# Patient Record
Sex: MALE | Race: WHITE | NOT HISPANIC OR LATINO | Employment: PART TIME | URBAN - METROPOLITAN AREA
[De-identification: names, ages, dates, MRNs, and addresses within clinical notes are randomized per-mention and may not be internally consistent; named-entity substitution may affect disease eponyms.]

---

## 2019-02-05 ENCOUNTER — HOSPITAL ENCOUNTER (OUTPATIENT)
Dept: CARDIOLOGY | Facility: MEDICAL CENTER | Age: 54
End: 2019-02-05
Attending: INTERNAL MEDICINE
Payer: COMMERCIAL

## 2019-02-05 ENCOUNTER — HOSPITAL ENCOUNTER (OUTPATIENT)
Dept: RADIOLOGY | Facility: MEDICAL CENTER | Age: 54
End: 2019-02-05
Attending: INTERNAL MEDICINE
Payer: COMMERCIAL

## 2019-02-05 ENCOUNTER — OFFICE VISIT (OUTPATIENT)
Dept: CARDIOLOGY | Facility: MEDICAL CENTER | Age: 54
End: 2019-02-05
Payer: COMMERCIAL

## 2019-02-05 ENCOUNTER — HOSPITAL ENCOUNTER (OUTPATIENT)
Dept: LAB | Facility: MEDICAL CENTER | Age: 54
End: 2019-02-05
Attending: INTERNAL MEDICINE
Payer: COMMERCIAL

## 2019-02-05 VITALS
OXYGEN SATURATION: 99 % | WEIGHT: 166 LBS | SYSTOLIC BLOOD PRESSURE: 118 MMHG | HEART RATE: 64 BPM | DIASTOLIC BLOOD PRESSURE: 80 MMHG | HEIGHT: 73 IN | BODY MASS INDEX: 22 KG/M2

## 2019-02-05 DIAGNOSIS — L65.9 HAIR LOSS: ICD-10-CM

## 2019-02-05 DIAGNOSIS — R10.12 LEFT UPPER QUADRANT PAIN: ICD-10-CM

## 2019-02-05 DIAGNOSIS — Z13.6 SCREENING FOR CARDIOVASCULAR CONDITION: ICD-10-CM

## 2019-02-05 DIAGNOSIS — G47.33 OSA (OBSTRUCTIVE SLEEP APNEA): ICD-10-CM

## 2019-02-05 DIAGNOSIS — R07.89 OTHER CHEST PAIN: ICD-10-CM

## 2019-02-05 DIAGNOSIS — I49.3 SYMPTOMATIC PVCS: ICD-10-CM

## 2019-02-05 DIAGNOSIS — R42 DIZZINESS: ICD-10-CM

## 2019-02-05 LAB
CHOLEST SERPL-MCNC: 165 MG/DL (ref 100–199)
HDLC SERPL-MCNC: 64 MG/DL
LDLC SERPL CALC-MCNC: 85 MG/DL
LV EJECT FRACT  99904: 60
LV EJECT FRACT MOD 2C 99903: 69.3
LV EJECT FRACT MOD 4C 99902: 49.75
LV EJECT FRACT MOD BP 99901: 61.74
TRIGL SERPL-MCNC: 80 MG/DL (ref 0–149)
TSH SERPL DL<=0.005 MIU/L-ACNC: 1.04 UIU/ML (ref 0.38–5.33)

## 2019-02-05 PROCEDURE — 76700 US EXAM ABDOM COMPLETE: CPT

## 2019-02-05 PROCEDURE — 80061 LIPID PANEL: CPT

## 2019-02-05 PROCEDURE — 84443 ASSAY THYROID STIM HORMONE: CPT

## 2019-02-05 PROCEDURE — 93306 TTE W/DOPPLER COMPLETE: CPT

## 2019-02-05 PROCEDURE — 99204 OFFICE O/P NEW MOD 45 MIN: CPT | Performed by: INTERNAL MEDICINE

## 2019-02-05 PROCEDURE — 93306 TTE W/DOPPLER COMPLETE: CPT | Mod: 26 | Performed by: INTERNAL MEDICINE

## 2019-02-05 PROCEDURE — 36415 COLL VENOUS BLD VENIPUNCTURE: CPT

## 2019-02-05 RX ORDER — NITROGLYCERIN 0.4 MG/1
0.4 TABLET SUBLINGUAL PRN
Qty: 25 TAB | Refills: 1 | Status: SHIPPED | OUTPATIENT
Start: 2019-02-05 | End: 2019-11-12

## 2019-02-05 ASSESSMENT — ENCOUNTER SYMPTOMS
DIZZINESS: 1
PARESTHESIAS: 1
BACK PAIN: 1
PALPITATIONS: 1
ABDOMINAL PAIN: 1

## 2019-02-05 NOTE — PROGRESS NOTES
Cardiology Initial Consultation Note    Date of note:    2/5/2019    Primary Care Provider: Shagufta Almzaan M.D.  Referring Provider: Seth Beach M.D.     Patient Name: Donovan Guzmán     YOB: 1965  MRN:              [id number]    Chief Complaint: palpitations, chest pain.     History of Present Illness: Donovan Guzmán is a 53 y.o. male whose current medical problems include previous smokint who is here for cardiac consultation for palpitations and chest pain.    He presents today with his wife Sloane.       A couple months ago he started having moderate severity palpitations which worsen with exercise and resolve with rest.  A couple days ago when hiking he starting getting dizzy and then starting having a dull moderate severity substernal chest pain.  This worsened to a sharp left sided chest pain for which he went to the [facility] emergency room. CTA was negative as was troponin. His chest pain doesn't change with position, food, or deep breathing. He continues to have this mild dull chest pain right now.  It is not exertional.     He has also felt a pulsatile mass in his left abdomen and had some back pain.     He previous saw Dr. Ervin in [place] and had a stress test, echo 2 years ago.     Two cups of coffee in the morning.  Vaping on and off all day.      Review of Systems   Cardiovascular: Positive for chest pain and palpitations.   Musculoskeletal: Positive for back pain.   Gastrointestinal: Positive for abdominal pain.   Neurological: Positive for dizziness and paresthesias.     + hair loss    All other systems reviewed and discussed using a comprehensive questionnaire and are negative.       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     No current facility-administered medications for this visit.          No Known Allergies      Family History   Problem Relation Age of Onset   • Heart Disease Sister         possible sick sinus syndrome.   • Heart Attack Neg Hx    • Heart Failure Neg Hx          Social History     Social History   • Marital status:      Spouse name: N/A   • Number of children: N/A   • Years of education: N/A     Occupational History   • Not on file.     Social History Main Topics   • Smoking status: Former Smoker     Packs/day: 1.00     Years: 30.00     Types: Cigarettes     Quit date: 2015   • Smokeless tobacco: Never Used      Comment: vaping nicotine   • Alcohol use No      Comment: previous alcoholic, quit 2014   • Drug use: No      Comment: h/o meth use   • Sexual activity: Not on file     Other Topics Concern   • Not on file     Social History Narrative    [place]         Physical Exam:  Ambulatory Vitals  Blood pressure 118/80, pulse 64, height 1.854 m (6' 1\"), weight 75.3 kg (166 lb), SpO2 99 %.   Oxygen Therapy:  Pulse Oximetry: 99 %  BP Readings from Last 4 Encounters:   02/05/19 118/80   02/03/19 141/98   02/16/17 130/95   05/06/16 130/87       Weight/BMI: Body mass index is 21.9 kg/m².  Wt Readings from Last 4 Encounters:   02/05/19 75.3 kg (166 lb)   02/03/19 77 kg (169 lb 12.1 oz)   02/16/17 78.9 kg (174 lb)   05/06/16 77.1 kg (170 lb)       General: Well appearing and in no apparent distress  Eyes: nl conjunctiva  ENT: OP clear, normal external appearance of nose and ears  Neck: JVP 4 cm H2O, no carotid bruits  Lungs: normal respiratory effort, CTAB  Heart: RRR, distant heart sounds, no murmurs, no rubs or gallops, no edema bilateral lower extremities. No LV/RV heave on cardiac palpatation. 1+ left radial pulse, 2+ right radial pulse.  2+ bilateral dp pulses.   Abdomen: soft, non tender, non distended, no masses, normal bowel sounds.  No HSM. Pulsatile aorta " palpated  Extremities/MSK: no clubbing, no cyanosis  Neurological: No focal sensory deficits  Psychiatric: Appropriate affect, A/O x 3, intact judgement and insight  Skin: Warm extremities      Lab Data Review:  No results found for: CHOLSTRLTOT, LDL, HDL, TRIGLYCERIDE    Lab Results   Component Value Date/Time    SODIUM 142 2019 07:45 PM    POTASSIUM 4.0 2019 07:45 PM    CHLORIDE 107 2019 07:45 PM    CO2 28 2019 07:45 PM    GLUCOSE 95 2019 07:45 PM    BUN 32 (H) 2019 07:45 PM    CREATININE 1.3 2019 07:45 PM     Lab Results   Component Value Date/Time    ALKPHOSPHAT 58 2019 07:45 PM    ASTSGOT 26 2019 07:45 PM    ALTSGPT 23 2019 07:45 PM    TBILIRUBIN 0.5 2019 07:45 PM      Lab Results   Component Value Date/Time    WBC 5.3 2019 07:45 PM     No components found for: HBGA1C  No components found for: TROPONIN  No components found for: BNP      Cardiac Imaging and Procedures Review:    EKG dated 2/3/2019 : My personal interpretation is NSR, non-specific ST changes, inferior leads, LAD.     Radiology test Review:    CTA chest 2/3/2019  FINDINGS:  Vasculature:  The pulmonary arterial vasculature is well opacified. I do not see evidence of pulmonary embolus as clinically questioned.    The aorta appears normal.    Heart:  The heart appears normal in size. No pericardial effusion is present. No pathologically enlarged lymph nodes are noted.    Lungs:  No pleural effusion is present. The pulmonary parenchyma appears unremarkable.  No evidence of lung nodule, lung mass, or area of consolidation is appreciated.    Limited images through the upper abdomen demonstrate grossly unremarkable upper portions of the liver and spleen.    No destructive osseous lesion is noted.    No grossly displaced rib fracture is identified.    If there is clinical suspicion of a rib fracture, please contact me.    Medical Decision Makin. Symptomatic PVCs  Discussed  decreasing vaping, caffiene  -start metoprolol tartrate 25mg PO bid  -holter monitor if worsens.  -obtain OSH holter from Dr. Ervin's office    2. Other chest pain  Atypical, non-exertional. PE and aortic pathology ruled out by CTA.  -start aspirin 81mg PO Daily  -ntg prn  -ED precautions discussed  -TMST    3. Left upper quadrant pain  He feels a new mass. Does have some significant aortic pulsatility  -check abdominal ultrasound    4. Dizziness  Only in the setting of exercise  -echo   -obtain OSH records  -TMST    5. DIANA (obstructive sleep apnea)  Will discuss CPAP and evaluation next visit    6. Screening for cardiovascular condition  Lipid panel    7. Hair loss  Check TSH      Return in about 3 months (around 5/5/2019).      Seth Beach MD, [facility]  [facility], [address]  [address] [address]  Phone: [phone]  Fax: [phone]

## 2019-02-05 NOTE — PATIENT INSTRUCTIONS
Please start aspirin 81mg once a day.     Please get fasting blood tests to check your cholesterol.     Please get a stress test here.     Please get an abdominal and heart ultrasound.    Please start nitroglycerin 0.4mg as needed for chest pain/pressure. Please call our office if you experience shortness of breath, decreased ability to exercise, or start having chest pain which goes away with rest or use of nitroglycerin.  If you experience chest pain that does not resolve with nitroglycerin and/or rest, please call 911 for emergency evaluation.

## 2019-02-05 NOTE — Clinical Note
Can we get OSH records from Dr. Ervin's office in [place] for the last 3 years, he should have a holter, echo, and stress test as well as progress notes.    Also, can we get the CD of the CT scan he had a couple days ago at [facility]?  Lastly, I forgot to order a TSH for him, can we mail him that lab slip and let him know there's one more test to check and see if thyroid dysfunction is the cause of his hair loss? Thanks!

## 2019-02-05 NOTE — LETTER
[facility]   [address]  [place] [address]  Phone: [phone]  Fax: [phone]              Donovan Guzmán  1965    Encounter Date: 2/5/2019    Seth Beach M.D.          PROGRESS NOTE:      Cardiology Initial Consultation Note    Date of note:    2/5/2019    Primary Care Provider: Shagufta Almazan M.D.  Referring Provider: Seth Beach M.D.     Patient Name: Donovan Guzmán     YOB: 1965  MRN:              [id number]    Chief Complaint: palpitations, chest pain.     History of Present Illness: Donovan Guzmán is a 53 y.o. male whose current medical problems include previous smokint who is here for cardiac consultation for palpitations and chest pain.    He presents today with his wife Sloane.       A couple months ago he started having moderate severity palpitations which worsen with exercise and resolve with rest.  A couple days ago when hiking he starting getting dizzy and then starting having a dull moderate severity substernal chest pain.  This worsened to a sharp left sided chest pain for which he went to the [facility] emergency room. CTA was negative as was troponin. His chest pain doesn't change with position, food, or deep breathing. He continues to have this mild dull chest pain right now.  It is not exertional.     He has also felt a pulsatile mass in his left abdomen and had some back pain.     He previous saw Dr. Ervin in [place] and had a stress test, echo 2 years ago.     Two cups of coffee in the morning.  Vaping on and off all day.      Review of Systems   Cardiovascular: Positive for chest pain and palpitations.   Musculoskeletal: Positive for back pain.   Gastrointestinal: Positive for abdominal pain.   Neurological: Positive for dizziness and paresthesias.     + hair loss    All other systems reviewed and discussed using a comprehensive questionnaire and are negative.       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     No current facility-administered medications for this visit.          No Known Allergies      Family History   Problem Relation Age of Onset   • Heart Disease Sister         possible sick sinus syndrome.   • Heart Attack Neg Hx    • Heart Failure Neg Hx          Social History     Social History   • Marital status:      Spouse name: N/A   • Number of children: N/A   • Years of education: N/A     Occupational History   • Not on file.     Social History Main Topics   • Smoking status: Former Smoker     Packs/day: 1.00     Years: 30.00     Types: Cigarettes     Quit date: 2015   • Smokeless tobacco: Never Used      Comment: vaping nicotine   • Alcohol use No      Comment: previous alcoholic, quit 2014   • Drug use: No      Comment: h/o meth use   • Sexual activity: Not on file     Other Topics Concern   • Not on file     Social History Narrative    [place]         Physical Exam:  Ambulatory Vitals  Blood pressure 118/80, pulse 64, height 1.854 m (6' 1\"), weight 75.3 kg (166 lb), SpO2 99 %.   Oxygen Therapy:  Pulse Oximetry: 99 %  BP Readings from Last 4 Encounters:   02/05/19 118/80   02/03/19 141/98   02/16/17 130/95   05/06/16 130/87       Weight/BMI: Body mass index is 21.9 kg/m².  Wt Readings from Last 4 Encounters:   02/05/19 75.3 kg (166 lb)   02/03/19 77 kg (169 lb 12.1 oz)   02/16/17 78.9 kg (174 lb)   05/06/16 77.1 kg (170 lb)       General: Well appearing and in no apparent distress  Eyes: nl conjunctiva  ENT: OP clear, normal external appearance of nose and ears  Neck: JVP 4 cm H2O, no carotid bruits  Lungs: normal respiratory effort, CTAB  Heart: RRR, distant heart sounds, no murmurs, no rubs or gallops, " no edema bilateral lower extremities. No LV/RV heave on cardiac palpatation. 1+ left radial pulse, 2+ right radial pulse.  2+ bilateral dp pulses.   Abdomen: soft, non tender, non distended, no masses, normal bowel sounds.  No HSM. Pulsatile aorta palpated  Extremities/MSK: no clubbing, no cyanosis  Neurological: No focal sensory deficits  Psychiatric: Appropriate affect, A/O x 3, intact judgement and insight  Skin: Warm extremities      Lab Data Review:  No results found for: CHOLSTRLTOT, LDL, HDL, TRIGLYCERIDE    Lab Results   Component Value Date/Time    SODIUM 142 02/03/2019 07:45 PM    POTASSIUM 4.0 02/03/2019 07:45 PM    CHLORIDE 107 02/03/2019 07:45 PM    CO2 28 02/03/2019 07:45 PM    GLUCOSE 95 02/03/2019 07:45 PM    BUN 32 (H) 02/03/2019 07:45 PM    CREATININE 1.3 02/03/2019 07:45 PM     Lab Results   Component Value Date/Time    ALKPHOSPHAT 58 02/03/2019 07:45 PM    ASTSGOT 26 02/03/2019 07:45 PM    ALTSGPT 23 02/03/2019 07:45 PM    TBILIRUBIN 0.5 02/03/2019 07:45 PM      Lab Results   Component Value Date/Time    WBC 5.3 02/03/2019 07:45 PM     No components found for: HBGA1C  No components found for: TROPONIN  No components found for: BNP      Cardiac Imaging and Procedures Review:    EKG dated 2/3/2019 : My personal interpretation is NSR, non-specific ST changes, inferior leads, LAD.     Radiology test Review:    CTA chest 2/3/2019  FINDINGS:  Vasculature:  The pulmonary arterial vasculature is well opacified. I do not see evidence of pulmonary embolus as clinically questioned.    The aorta appears normal.    Heart:  The heart appears normal in size. No pericardial effusion is present. No pathologically enlarged lymph nodes are noted.    Lungs:  No pleural effusion is present. The pulmonary parenchyma appears unremarkable.  No evidence of lung nodule, lung mass, or area of consolidation is appreciated.    Limited images through the upper abdomen demonstrate grossly unremarkable upper portions of the  liver and spleen.    No destructive osseous lesion is noted.    No grossly displaced rib fracture is identified.    If there is clinical suspicion of a rib fracture, please contact me.    Medical Decision Makin. Symptomatic PVCs  Discussed decreasing vaping, caffiene  -start metoprolol tartrate 25mg PO bid  -holter monitor if worsens.  -obtain OSH holter from Dr. Ervin's office    2. Other chest pain  Atypical, non-exertional. PE and aortic pathology ruled out by CTA.  -start aspirin 81mg PO Daily  -ntg prn  -ED precautions discussed  -TMST    3. Left upper quadrant pain  He feels a new mass. Does have some significant aortic pulsatility  -check abdominal ultrasound    4. Dizziness  Only in the setting of exercise  -echo   -obtain OSH records  -TMST    5. DIANA (obstructive sleep apnea)  Will discuss CPAP and evaluation next visit    6. Screening for cardiovascular condition  Lipid panel    7. Hair loss  Check TSH      Return in about 3 months (around 2019).      Seth Beach MD, [facility]  [facility] [facility], [facility]  [address] [address]  Phone: [phone]  Fax: [phone]              Shagufta Almazan M.D.  [address] [place] [id number]  VIA Facsimile: [phone]

## 2019-02-26 ENCOUNTER — NON-PROVIDER VISIT (OUTPATIENT)
Dept: CARDIOLOGY | Facility: MEDICAL CENTER | Age: 54
End: 2019-02-26
Attending: INTERNAL MEDICINE
Payer: COMMERCIAL

## 2019-02-26 VITALS
HEART RATE: 66 BPM | DIASTOLIC BLOOD PRESSURE: 86 MMHG | HEIGHT: 73 IN | WEIGHT: 173 LBS | SYSTOLIC BLOOD PRESSURE: 120 MMHG | BODY MASS INDEX: 22.93 KG/M2 | OXYGEN SATURATION: 99 %

## 2019-02-26 DIAGNOSIS — R07.89 OTHER CHEST PAIN: ICD-10-CM

## 2019-02-26 DIAGNOSIS — R42 DIZZINESS: ICD-10-CM

## 2019-02-26 DIAGNOSIS — I49.3 SYMPTOMATIC PVCS: ICD-10-CM

## 2019-02-26 LAB — TREADMILL STRESS: NORMAL

## 2019-02-26 PROCEDURE — 93015 CV STRESS TEST SUPVJ I&R: CPT | Performed by: INTERNAL MEDICINE

## 2023-10-30 PROBLEM — K64.8 OTHER HEMORRHOIDS: Status: ACTIVE | Noted: 2018-03-06

## 2023-10-30 PROBLEM — K62.1 RECTAL POLYP: Status: ACTIVE | Noted: 2018-03-06

## 2023-10-30 PROBLEM — L63.0 ALOPECIA TOTALIS: Status: ACTIVE | Noted: 2023-10-30

## 2023-10-30 PROBLEM — K44.9 DIAPHRAGMATIC HERNIA WITHOUT OBSTRUCTION OR GANGRENE: Status: ACTIVE | Noted: 2018-05-02

## 2023-10-30 PROBLEM — R13.19 ESOPHAGEAL DYSPHAGIA: Status: ACTIVE | Noted: 2023-10-30

## 2023-11-30 ENCOUNTER — TELEPHONE (OUTPATIENT)
Dept: SLEEP MEDICINE | Facility: MEDICAL CENTER | Age: 58
End: 2023-11-30
Payer: COMMERCIAL

## 2023-11-30 NOTE — TELEPHONE ENCOUNTER
1. Age 50-77 yrs of age? 58 yrs    2. Total Years Smoking cigarettes? 25 yrs    3. 20 pack year hx of smoking, or greater (average packs per day)?   25 pk/yrs @ 1pk/day     4. Current smoker or if quit, has pt quit within last 15 yrs?  Quit 8 yrs    5. Completed Lung Cancer screen CT with [facility] previously?  NONE    6. Anything noted on previous CT involving lungs? (Nodules, Mass, Etc.)  NONE    7. Any signs or symptoms of lung cancer? ( New / change to Cough, Wheezing, S.O.B., coughing up blood, unexplained weight loss within last year)?   NONE    8. Previous history of lung cancer?   NONE

## 2023-12-12 NOTE — PROGRESS NOTES
"Subjective     Donovan Guzmán is a 58 y.o. male who presents with Lung Cancer Screening Program Prescreen  Hx of nicotine Dependence          HPI  Patient seen today for initial lung cancer screening visit. Patient referred by his PCP, Dr. Boo Allen.     The patient meets eligibility criteria including age, smoking history (20+ pack years), if former smoker, quit in the last 15 years, and absence of signs or symptoms of lung cancer.    - Age - 58  - Smoking history - Patient has smoked for 25 years at an average of 1 ppd = 25 pack year smoking history.  - Current smoking status - former smoker, quit in 2015  - No symptoms of lung cancer and no previous history of lung cancer     No Known Allergies    Current Outpatient Medications on File Prior to Visit   Medication Sig Dispense Refill    acetaminophen (TYLENOL) 650 MG CR tablet Take 1 Tablet by mouth 3 times a day. 90 Tablet 0    ibuprofen (MOTRIN) 800 MG Tab Take 1 Tablet by mouth 3 times a day with meals. 90 Tablet 0     No current facility-administered medications on file prior to visit.       Review of Systems   Constitutional:  Negative for chills, fever and weight loss.   Respiratory:  Negative for cough, hemoptysis, sputum production, shortness of breath and wheezing.    Cardiovascular:  Negative for chest pain and palpitations.     He has had chronic left sided chest and back discomfort for 4 months without recent worsening.  The left sided lymphadenopathy over the last couple months but that is improving.  The chest pain does not worsen with deep breath or activity.  The discomfort is always present.         Objective     /78 (BP Location: Right arm, Patient Position: Sitting)   Pulse (!) 58   Resp 16   Ht 1.854 m (6' 1\")   Wt 77.1 kg (170 lb)   SpO2 97%   BMI 22.43 kg/m²      Physical Exam  Constitutional:       Appearance: Normal appearance.   Cardiovascular:      Rate and Rhythm: Normal rate and regular rhythm.   Pulmonary:      " Effort: Pulmonary effort is normal.      Breath sounds: Normal breath sounds.   Musculoskeletal:         General: No swelling.   Neurological:      Mental Status: He is alert.               Assessment & Plan        1. Personal history of tobacco use, presenting hazards to health  CT-LUNG CANCER-SCREENING             We conducted a shared decision-making process using a decision aid. We reviewed benefits and harms of screening, including false positives and potential need for additional diagnostic testing, the possibility of over diagnosis, and total radiation exposure.    We discussed the importance of adhering to annual LDCT screening. We also discussed the impact of comorbities on the patient's the ability or willingness to undergo diagnostic procedure(s) and treatment.    Counseling on the importance of maintaining cigarette smoking abstinence if former smoker; or the importance of smoking cessation if current smoker and, if appropriate, furnishing of information about tobacco cessation interventions.    Based on our discussion, we have decided to begin annual lung cancer screening starting now.    No follow-up needed unless imaging is abnormal

## 2023-12-13 ENCOUNTER — OFFICE VISIT (OUTPATIENT)
Dept: SLEEP MEDICINE | Facility: MEDICAL CENTER | Age: 58
End: 2023-12-13
Attending: FAMILY MEDICINE
Payer: COMMERCIAL

## 2023-12-13 VITALS
OXYGEN SATURATION: 97 % | SYSTOLIC BLOOD PRESSURE: 100 MMHG | HEIGHT: 73 IN | WEIGHT: 170 LBS | RESPIRATION RATE: 16 BRPM | BODY MASS INDEX: 22.53 KG/M2 | DIASTOLIC BLOOD PRESSURE: 76 MMHG | HEART RATE: 58 BPM

## 2023-12-13 DIAGNOSIS — Z87.891 PERSONAL HISTORY OF TOBACCO USE, PRESENTING HAZARDS TO HEALTH: ICD-10-CM

## 2023-12-13 PROCEDURE — G0296 VISIT TO DETERM LDCT ELIG: HCPCS | Performed by: FAMILY MEDICINE

## 2023-12-13 PROCEDURE — 3074F SYST BP LT 130 MM HG: CPT | Performed by: FAMILY MEDICINE

## 2023-12-13 PROCEDURE — 3078F DIAST BP <80 MM HG: CPT | Performed by: FAMILY MEDICINE

## 2023-12-13 ASSESSMENT — ENCOUNTER SYMPTOMS
FEVER: 0
SPUTUM PRODUCTION: 0
PALPITATIONS: 0
SHORTNESS OF BREATH: 0
HEMOPTYSIS: 0
WEIGHT LOSS: 0
COUGH: 0
CHILLS: 0
WHEEZING: 0

## 2023-12-13 ASSESSMENT — FIBROSIS 4 INDEX: FIB4 SCORE: 1.14

## 2023-12-13 NOTE — Clinical Note
Thank you for referring Donovan to the Lung Cancer Screening program.  I enrolled him today. I will update you re: abnormal findings. -Dr. Steff Duncan

## 2024-03-14 ENCOUNTER — TELEPHONE (OUTPATIENT)
Dept: SLEEP MEDICINE | Facility: MEDICAL CENTER | Age: 59
End: 2024-03-14
Payer: COMMERCIAL

## 2024-03-14 NOTE — LETTER
[facility]  [address]  [place] [address]  P [phone]  F [phone]     Date: March 18, 2024    Donovan Guzmán  [address] [place] [address] [place] [address]    Re:  Low-dose chest CT performed on 3/13/24     Medical Record Number: [id number]    Dear Donovan,    We are writing to let you know that the results of your recent low-dose chest CT (LDCT) examination shows one or more lung nodule(s) which are likely benign (not cancer).  Lung nodules are very common and many people without cancer have these nodules.  To make sure these nodule(s) are benign, and remain unchanged, your radiologist recommends you have another low-dose chest CT on or around 3/13/2025.  In the event that any additional “incidental” findings were identified from this exam, we have communicated back to your primary care provider for follow-up.    Here are some other important points you should know:   Your low-dose Chest CT report has been sent to your referring or primary health care provider and is available to participants in [employer].  As a part of our Lung Cancer Screening program we will remind you and your referring health care provider when your next LDCT screening is due.   Although low-dose chest CT is very effective at finding lung cancer early, it cannot find all lung cancers. If you develop any new symptoms such as shortness of breath, chest pain, or coughing up blood, please call your doctor.   Please keep in mind that good health involves quitting smoking (for help, call [facility] Quit Tobacco program at [phone]), an annual physical exam, and continued screening with low-dose chest CT.    Thank you for participating in the Lung Cancer Screening program. If you have any questions about this letter or our program, please call our Physician  at [phone].    Sincerely,  Dr. Steff Duncan  [facility] Lung Cancer Screening Program

## 2024-03-14 NOTE — TELEPHONE ENCOUNTER
Pt was scheduled for his CT-lung cancer screening to [facility] on 3/13/24.  Message sent to Brii to outreach to get results.    -Dr. Steff Duncan

## 2024-03-15 ENCOUNTER — TELEPHONE (OUTPATIENT)
Dept: SLEEP MEDICINE | Facility: MEDICAL CENTER | Age: 59
End: 2024-03-15
Payer: COMMERCIAL

## 2024-03-15 NOTE — TELEPHONE ENCOUNTER
Phoned patient with results of LDCT exam performed on 3/13/24.    Notified him that the results showed very small nodule at right middle lung that had a benign (or non cancer) appearance.    To make sure these nodule(s) are benign, and remain unchanged, we recommend a follow-up low-dose chest CT in 12 months, which will be ordered per PCP/referring provider.    Patient informed of incidental findings of emphysematous changes, calcified granuloma and lipoma appearing area in the paraspinal region with recommendation to follow-up up with PCP/referring provider.  Patient agrees to all recommendations.    His PCP, Dr. Boo Allen, was notified of results and incidental findings per this correspondence.    [facility] updated and patient sent lung cancer screening result letter.        See CT results in Media/imaging section of Epic

## 2024-03-18 ENCOUNTER — HOSPITAL ENCOUNTER (OUTPATIENT)
Dept: RADIOLOGY | Facility: MEDICAL CENTER | Age: 59
End: 2024-03-18
Attending: FAMILY MEDICINE
Payer: COMMERCIAL

## 2024-03-18 NOTE — TELEPHONE ENCOUNTER
Phoned patient with results of LDCT exam performed on 3/13/24.    Notified him that the results showed very small nodule(s) at right middle lung that had a benign (or non cancer) appearance.    To make sure these nodule(s) are benign, and remain unchanged, we recommend a follow-up low-dose chest CT in 12 months, which will be ordered per PCP/referring provider.    Patient informed of incidental findings of emphysematous changes, a calcified granuloma and lipoma appearing area on his back with recommendation to follow-up up with PCP/referring provider.  Patient agrees to all recommendations.    His PCP, Dr. Boo Allen, was notified of results and incidental findings per this correspondence.    [facility] updated and patient sent lung cancer screening result letter.    Results are in the Media section of epic

## 2024-11-04 PROBLEM — D17.9 LIPOMA OF MUSCLE: Status: ACTIVE | Noted: 2024-11-04

## 2025-06-11 ENCOUNTER — TELEPHONE (OUTPATIENT)
Dept: HEALTH INFORMATION MANAGEMENT | Facility: OTHER | Age: 60
End: 2025-06-11

## 2025-06-11 NOTE — TELEPHONE ENCOUNTER
Do you have any new problems with your breathing since your last exam?   (Like a cough that does not go away, chest pain when you breathe or cough, coughing up blood or rust colored phlegm or spit, shortness of breath that is new to you) -NO     **If new symptoms are attributed to lingering Covid infection, patient is still    eligible unless coughing up blood but should wait 2 months after Covid diagnosis for an accurate screening result. Still request order if patient qualifies.**       Have you been diagnosed with lung cancer since your last exam?  -NO    Are you a current smoker or former smoker? If former smoker, did you quit smoking within the last 15 years? -FORMER     Have you had a CT scan of your chest within the past 12 mos.? -NO